# Patient Record
Sex: FEMALE | Race: WHITE
[De-identification: names, ages, dates, MRNs, and addresses within clinical notes are randomized per-mention and may not be internally consistent; named-entity substitution may affect disease eponyms.]

---

## 2019-02-13 ENCOUNTER — HOSPITAL ENCOUNTER (OUTPATIENT)
Dept: HOSPITAL 95 - ORSCSDS | Age: 64
Discharge: HOME | End: 2019-02-13
Attending: INTERNAL MEDICINE
Payer: COMMERCIAL

## 2019-02-13 VITALS — WEIGHT: 157.19 LBS | BODY MASS INDEX: 26.84 KG/M2 | HEIGHT: 64.02 IN

## 2019-02-13 DIAGNOSIS — K21.9: ICD-10-CM

## 2019-02-13 DIAGNOSIS — Z86.73: ICD-10-CM

## 2019-02-13 DIAGNOSIS — D12.4: ICD-10-CM

## 2019-02-13 DIAGNOSIS — E03.9: ICD-10-CM

## 2019-02-13 DIAGNOSIS — K63.5: ICD-10-CM

## 2019-02-13 DIAGNOSIS — Z86.010: ICD-10-CM

## 2019-02-13 DIAGNOSIS — Z12.11: Primary | ICD-10-CM

## 2019-02-13 DIAGNOSIS — Z85.3: ICD-10-CM

## 2019-02-13 DIAGNOSIS — K57.30: ICD-10-CM

## 2019-02-13 DIAGNOSIS — Z79.899: ICD-10-CM

## 2019-02-13 PROCEDURE — 0DBN8ZX EXCISION OF SIGMOID COLON, VIA NATURAL OR ARTIFICIAL OPENING ENDOSCOPIC, DIAGNOSTIC: ICD-10-PCS | Performed by: INTERNAL MEDICINE

## 2019-02-13 PROCEDURE — 0DBM8ZX EXCISION OF DESCENDING COLON, VIA NATURAL OR ARTIFICIAL OPENING ENDOSCOPIC, DIAGNOSTIC: ICD-10-PCS | Performed by: INTERNAL MEDICINE

## 2019-02-13 NOTE — NUR
02/13/19 1319 Nehf,Kaci R
1 IV MISSED BY MA IN RT HAND VEIN BLEW
1 IV MISSED IN RT AC BY MA VEIN BLEW, 1 IV MISSED
IN LT WRIST BY MA HIT VALVE, 1 IV MISSED IN LT AC BY MA HIT VALVE, 1
GOOD IV IN LT WRIST. PT TOW.